# Patient Record
Sex: FEMALE | Race: WHITE
[De-identification: names, ages, dates, MRNs, and addresses within clinical notes are randomized per-mention and may not be internally consistent; named-entity substitution may affect disease eponyms.]

---

## 2020-02-15 ENCOUNTER — HOSPITAL ENCOUNTER (EMERGENCY)
Dept: HOSPITAL 26 - MED | Age: 55
Discharge: HOME | End: 2020-02-15
Payer: COMMERCIAL

## 2020-02-15 VITALS — SYSTOLIC BLOOD PRESSURE: 128 MMHG | DIASTOLIC BLOOD PRESSURE: 74 MMHG

## 2020-02-15 VITALS — WEIGHT: 150 LBS | BODY MASS INDEX: 24.99 KG/M2 | HEIGHT: 65 IN

## 2020-02-15 VITALS — DIASTOLIC BLOOD PRESSURE: 71 MMHG | SYSTOLIC BLOOD PRESSURE: 130 MMHG
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
## 2020-02-15 DIAGNOSIS — S93.491A: Primary | ICD-10-CM
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
## 2020-02-15 DIAGNOSIS — Z98.890: ICD-10-CM
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
## 2020-02-15 PROCEDURE — 73610 X-RAY EXAM OF ANKLE: CPT

## 2020-02-15 PROCEDURE — 99283 EMERGENCY DEPT VISIT LOW MDM: CPT

## 2020-02-15 NOTE — NUR
53 Y/O FEMALE C/O RT ANKLE PAIN S/P SLIPPING OFF CURB 15 MIN AGO. 6/10 
THROBBING PAIN TO RT ANKLE. NOTICABLE SWELLING TO LATERAL ANKLE. +CMS. PT 
STATES SHE IS UNSURE IF SHE CAN AMBULATE, WHEELCHAIR ASSISTED TO BED. STATES 
INCREASED PAIN WITH MOVEMENT. CAP REFILL <2 SEC. TOOK ADVIL X HR AGO FOR 
HEADACHE. PTS  AT BEDSIDE. VSS. 



MEDHX: DENIES 

ALLERGIES: SABRINA

## 2020-02-15 NOTE — NUR
PT C/O RIGTH ANKLE PAIN AFTER ROLLING ANKLE OFF A CURB. PT APPEARS TO BE IN NO 
DISTRESS AT THIS TIME. PT PMSC INTACT. SLIGHT SWELLING NOTED ON LATERAL SIDE OF 
RIGHT ANKLE. WAITING FOR X-RAY RESULT.

## 2020-02-15 NOTE — NUR
CLIFF [place] AT BEDSIDE PLACING ACE WRAP ON RIGHT ANKLE. NURSE SUSANNA TEACHING 
CRUTCH TRAINING FOR PT. DR. UREÑA AT BEDSIDE DC PT.